# Patient Record
Sex: MALE | Race: OTHER | NOT HISPANIC OR LATINO | URBAN - METROPOLITAN AREA
[De-identification: names, ages, dates, MRNs, and addresses within clinical notes are randomized per-mention and may not be internally consistent; named-entity substitution may affect disease eponyms.]

---

## 2020-10-06 ENCOUNTER — EMERGENCY (EMERGENCY)
Facility: HOSPITAL | Age: 45
LOS: 1 days | Discharge: HOME | End: 2020-10-06
Attending: EMERGENCY MEDICINE | Admitting: EMERGENCY MEDICINE
Payer: COMMERCIAL

## 2020-10-06 VITALS
OXYGEN SATURATION: 98 % | WEIGHT: 164.91 LBS | HEART RATE: 74 BPM | DIASTOLIC BLOOD PRESSURE: 96 MMHG | TEMPERATURE: 98 F | SYSTOLIC BLOOD PRESSURE: 147 MMHG | RESPIRATION RATE: 18 BRPM

## 2020-10-06 VITALS
HEART RATE: 73 BPM | DIASTOLIC BLOOD PRESSURE: 82 MMHG | OXYGEN SATURATION: 98 % | RESPIRATION RATE: 18 BRPM | SYSTOLIC BLOOD PRESSURE: 132 MMHG | TEMPERATURE: 98 F
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
## 2020-10-06 DIAGNOSIS — M54.2 CERVICALGIA: ICD-10-CM
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
## 2020-10-06 DIAGNOSIS — S22.31XA FRACTURE OF ONE RIB, RIGHT SIDE, INITIAL ENCOUNTER FOR CLOSED FRACTURE: ICD-10-CM
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
## 2020-10-06 DIAGNOSIS — R07.82 INTERCOSTAL PAIN: ICD-10-CM
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
## 2020-10-06 PROCEDURE — 93976 VASCULAR STUDY: CPT

## 2020-10-06 PROCEDURE — 73030 X-RAY EXAM OF SHOULDER: CPT | Mod: 26,RT

## 2020-10-06 PROCEDURE — 71046 X-RAY EXAM CHEST 2 VIEWS: CPT | Mod: 26

## 2020-10-06 PROCEDURE — 99285 EMERGENCY DEPT VISIT HI MDM: CPT | Mod: 25

## 2020-10-06 PROCEDURE — 71100 X-RAY EXAM RIBS UNI 2 VIEWS: CPT | Mod: 26,RT

## 2020-10-06 PROCEDURE — 73030 X-RAY EXAM OF SHOULDER: CPT | Mod: 26

## 2020-10-06 PROCEDURE — 99284 EMERGENCY DEPT VISIT MOD MDM: CPT | Mod: 25

## 2020-10-06 PROCEDURE — 71100 X-RAY EXAM RIBS UNI 2 VIEWS: CPT | Mod: 26

## 2020-10-06 PROCEDURE — 71100 X-RAY EXAM RIBS UNI 2 VIEWS: CPT

## 2020-10-06 PROCEDURE — 73030 X-RAY EXAM OF SHOULDER: CPT

## 2020-10-06 PROCEDURE — 93976 VASCULAR STUDY: CPT | Mod: 26

## 2020-10-06 PROCEDURE — 96374 THER/PROPH/DIAG INJ IV PUSH: CPT

## 2020-10-06 PROCEDURE — 71046 X-RAY EXAM CHEST 2 VIEWS: CPT

## 2020-10-06 RX ORDER — METHOCARBAMOL 500 MG/1
1 TABLET, FILM COATED ORAL
Qty: 15 | Refills: 0
Start: 2020-10-06 | End: 2020-10-10

## 2020-10-06 RX ORDER — LIDOCAINE 4 G/100G
1 CREAM TOPICAL ONCE
Refills: 0 | Status: COMPLETED | OUTPATIENT
Start: 2020-10-06 | End: 2020-10-06

## 2020-10-06 RX ORDER — OXYCODONE HYDROCHLORIDE 5 MG/1
5 TABLET ORAL ONCE
Refills: 0 | Status: DISCONTINUED | OUTPATIENT
Start: 2020-10-06 | End: 2020-10-06

## 2020-10-06 RX ORDER — METHOCARBAMOL 500 MG/1
750 TABLET, FILM COATED ORAL ONCE
Refills: 0 | Status: COMPLETED | OUTPATIENT
Start: 2020-10-06 | End: 2020-10-06

## 2020-10-06 RX ORDER — KETOROLAC TROMETHAMINE 30 MG/ML
15 SYRINGE (ML) INJECTION ONCE
Refills: 0 | Status: DISCONTINUED | OUTPATIENT
Start: 2020-10-06 | End: 2020-10-06

## 2020-10-06 RX ORDER — OXYCODONE HYDROCHLORIDE 5 MG/1
1 TABLET ORAL
Qty: 12 | Refills: 0
Start: 2020-10-06 | End: 2020-10-09

## 2020-10-06 RX ORDER — IBUPROFEN 200 MG
1 TABLET ORAL
Qty: 30 | Refills: 0
Start: 2020-10-06 | End: 2020-10-15

## 2020-10-06 RX ADMIN — Medication 15 MILLIGRAM(S): at 12:16

## 2020-10-06 RX ADMIN — OXYCODONE HYDROCHLORIDE 5 MILLIGRAM(S): 5 TABLET ORAL at 13:21

## 2020-10-06 RX ADMIN — LIDOCAINE 1 PATCH: 4 CREAM TOPICAL at 13:21

## 2020-10-06 RX ADMIN — LIDOCAINE 1 PATCH: 4 CREAM TOPICAL at 12:16

## 2020-10-06 RX ADMIN — Medication 15 MILLIGRAM(S): at 13:00

## 2020-10-06 RX ADMIN — LIDOCAINE 1 PATCH: 4 CREAM TOPICAL at 13:22

## 2020-10-06 RX ADMIN — METHOCARBAMOL 750 MILLIGRAM(S): 500 TABLET, FILM COATED ORAL at 12:39

## 2020-10-06 NOTE — ED PROVIDER NOTE - OBJECTIVE STATEMENT
43 y/o M with no PMHx presents to the ED c/o severe pain to the right side, radiating up to the right shoulder, s/p slip and fall. Pt states he was in the shower earlier today when he slipped and fell onto a railing, landing on his right side. Denies hitting his head or LOC. Pt then went to an urgent care, where he was given tylenol and sent to the ED for evaluation. Currently states he is in excruciating pain and feels shocks of pain up to the shoulder, as well as some neck pain. Denies numbness, tingling, chest pain, palpitations, cough, SOB, or abdominal pain. Pt does currently have some hives as he is allergic to tylenol, but is most concerned about the pain.

## 2020-10-06 NOTE — ED ADULT TRIAGE NOTE - CHIEF COMPLAINT QUOTE
Patient BIBA c/o rt shoulder pain , s/p slipped and fall in the bathroom this am , denies any loc . not on thinner . Denies any head injury . Was sent from urgent care for evaluation .

## 2020-10-06 NOTE — ED PROVIDER NOTE - NO SIGNIFICANT PAST SURGICAL HISTORY
Anesthesia Volume In Cc (Will Not Render If 0): 0.5 <<----- Click to add NO significant Past Surgical History

## 2020-10-06 NOTE — ED PROVIDER NOTE - CARE PLAN
Principal Discharge DX:	Closed fracture of one rib of right side, initial encounter  Secondary Diagnosis:	Musculoskeletal pain

## 2020-10-06 NOTE — ED PROVIDER NOTE - PATIENT PORTAL LINK FT
You can access the FollowMyHealth Patient Portal offered by [facility] by registering at the following website: [email or web address]. By joining [facility]’s FollowMyHealth portal, you will also be able to view your health information using other applications (apps) compatible with our system.

## 2020-10-06 NOTE — ED PROVIDER NOTE - DIAGNOSTIC INTERPRETATION
ER PA: Marely Mack  CHEST XRAY INTERPRETATION: displaced 10th rib fracture noted, lungs clear, heart shadow normal. ER PA: Marely Mack  CHEST XRAY INTERPRETATION: displaced 10th rib fracture noted, lungs clear, heart shadow normal.    ER Physician Assistant: right shoulder  INTERPRETATION: no acute fracture; no soft tissue swelling noted; normal bony alignment.

## 2020-10-06 NOTE — ED ADULT NURSE NOTE - NSIMPLEMENTINTERV_GEN_ALL_ED
Implemented All Fall Risk Interventions:  [place] to call system. Call bell, personal items and telephone within reach. Instruct patient to call for assistance. Room bathroom lighting operational. Non-slip footwear when patient is off stretcher. Physically safe environment: no spills, clutter or unnecessary equipment. Stretcher in lowest position, wheels locked, appropriate side rails in place. Provide visual cue, wrist band, yellow gown, etc. Monitor gait and stability. Monitor for mental status changes and reorient to person, place, and time. Review medications for side effects contributing to fall risk. Reinforce activity limits and safety measures with patient and family.

## 2020-10-06 NOTE — ED PROVIDER NOTE - PHYSICAL EXAMINATION
General: Patient is well developed and well nourised. Patient is alert and oriented to person, place and date. Patient is sitting stretcher and appears in no acute distress.  HEENT: Head is normocephalic and atraumatic. Pupils are equal, round and reactive. Extraocular movements intact. No evidence of nystagmus, conjunctival injection, or scleral icterus. External ears symmetric without evidence of discharge.  Nose is symmetric, non-tender, patent without evidence of discharge. Teeth in good repair. Uvula midline.   Neck: Supple with no evidence of lymphadenopathy.  Full range of motion.  Heart: Regular rate and rhythm. No murmurs, rubs or gallops.   Lungs: Clear to auscultation bilaterally with equal chest expansion. No note of wheezes, rhonchi, rales. Equal chest expansion. No note of retractions.  Abdomen: Bowel sounds present in all four quadrants. Soft, non-tender, non-distended without signs of masses, rebound or guarding. No note of hepatosplenomegaly. No CVA tenderness bilaterally. Negative Chowdhuyr sign or McBurney's.  Musculoskeletal: point ttp right lateral/axillary inferior ribs. no ecchymosis, no crepitus or flail chest. no spinal ttp. ttp right cervical paraspinal muscles. No edema, erythema, ecchymosis, atrophy or deformity. F No clubbing or cyanosis. No point tenderness to palpation.   Neuro: Cranial nerves II-XII intact. GCS 15. Moving all extremities. Strength is 5/5 arms and legs bilaterally. Sensation intact in extremities. gait steady   Skin: Warm, dry and intact without evidence of rashes, bruising, pallor, jaundice or cyanosis.   Psych: Mood and affect appropriate.

## 2020-10-06 NOTE — ED PROVIDER NOTE - ATTENDING CONTRIBUTION TO CARE
43 yo M no pmh p/w slip and fall in shower earlier today no head injury back or neck pain, fell on bar to R ribs, no cough, sob, chest pain, ab pain, weakness.  Pt looks well, uncomfortable with breathing.  Point ttp R midaxillary ribs, R soft tissue trap pain. Plan robaxin, nsaids and lidocaine, xray and reassess and FAST exam.

## 2020-10-06 NOTE — ED PROVIDER NOTE - NSFOLLOWUPINSTRUCTIONS_ED_ALL_ED_FT
please take medication as prescribed-- please do not drink alcohol, use drugs or drive care/operate machinery while taking these medications    please use over the counter "lidocaine patches" which may be placed on areas of discomfort (your shoulder)    please use heat on areas of muscle (your neck/shoulder pain), ice on areas of bone (where you have your broken rib)    please use incentive spirometer       RIB FRACTURE - Discharge Care           Rib Fracture    WHAT YOU NEED TO KNOW:    A rib fracture is a crack or break in a rib bone. Rib fractures usually heal within 6 weeks. You should be able to return to your usual activities before that time.     Rib Cage         DISCHARGE INSTRUCTIONS:    Call your local emergency number (911 in the ) if:   •You have trouble breathing.       •You have new or increased pain.      Seek care immediately if:   •Your pain does not get better, even after treatment.      •You have a fever or a cough.       Call your doctor if:   •You have questions or concerns about your condition or care.           Medicines: You may need any of the following:   •NSAIDs, such as ibuprofen, help decrease swelling, pain, and fever. This medicine is available with or without a doctor's order. NSAIDs can cause stomach bleeding or kidney problems in certain people. If you take blood thinner medicine, always ask your healthcare provider if NSAIDs are safe for you. Always read the medicine label and follow directions.      •Prescription pain medicine may be given. Ask your healthcare provider how to take this medicine safely. Some prescription pain medicines contain acetaminophen. Do not take other medicines that contain acetaminophen without talking to your healthcare provider. Too much acetaminophen may cause liver damage. Prescription pain medicine may cause constipation. Ask your healthcare provider how to prevent or treat constipation.       •Take your medicine as directed. Contact your healthcare provider if you think your medicine is not helping or if you have side effects. Tell him or her if you are allergic to any medicine. Keep a list of the medicines, vitamins, and herbs you take. Include the amounts, and when and why you take them. Bring the list or the pill bottles to follow-up visits. Carry your medicine list with you in case of an emergency.      Self-care:   •Take deep breaths and cough 10 times each hour. This will decrease your risk for a lung infection. Hug a pillow on your injured side to decrease pain while you take deep breaths. Take a deep breath and hold it for as long as you can. Let the air out and then cough. Deep breaths help open your airway. You may be given an incentive spirometer to help you take deep breaths. Put the plastic piece in your mouth and take a slow, deep breath, then let the air out and cough. Repeat these steps 10 times every hour.       •Rest and limit activity as directed. Do not pull, push, or lift objects. Start to do more as your pain decreases. Ask your healthcare provider how much activity you can do.      •Apply ice on your chest near your fractured rib for 15 to 20 minutes every hour or as directed. Use an ice pack, or put crushed ice in a plastic bag. Cover it with a towel. Ice helps prevent tissue damage and decreases swelling and pain.      Follow up with your doctor as directed: Write down your questions so you remember to ask them during your visits.                              HOW TO USE AN INCENTIVE SPIROMETER - AfterCare(R) Instructions(ER/ED)           How to Use an Incentive Spirometer    WHAT YOU NEED TO KNOW:    An incentive spirometer is a device that measures how deeply you can inhale (breathe in). It helps you take slow, deep breaths to expand and fill your lungs with air. This helps prevent lung problems, such as pneumonia. The incentive spirometer is made up of a breathing tube, an air chamber, and an indicator. The breathing tube is connected to the air chamber and has a mouthpiece at the end. The indicator is found inside the device.     DISCHARGE INSTRUCTIONS:    Reasons to use an incentive spirometer: An incentive spirometer is most commonly used after surgery. People who are at increased risk of airway or breathing problems may also use one. These include people who smoke or have lung disease. This may also include people who are not active or cannot move well.     How to use an incentive spirometer: Sit up as straight as possible. Do not bend your head forward or backward. Hold the incentive spirometer in an upright position. Place the target pointer to the level that you need to reach or that your healthcare provider has suggested. Exhale (breathe out) normally and then do the following:  •Put the mouthpiece in your mouth and close your lips tightly around it. Do not block the mouthpiece with your tongue.       •Inhale slowly and deeply through the mouthpiece to raise the indicator. Try to make the indicator rise up to the level of the goal marker.      •When you cannot inhale any longer, remove the mouthpiece and hold your breath for at least 3 seconds.      •Exhale normally.      •Repeat these steps 10 to 12 times every hour when you are awake, or as often as directed.      •Clean the mouthpiece with soap and water after each use. Do not use a disposable mouthpiece for longer than 24 hours.      •Keep a log of the highest level you are able to reach each time. This will help healthcare providers see if your lung function improves.  How to use and Incentive Spirometer           Follow up with your healthcare provider as directed: Write down your questions so you remember to ask them during your visits.     Contact your healthcare provider if:   •You feel dizzy or lightheaded.      •You have a wound that is painful every time you breathe deeply.      •You have questions or concerns about how to use your IS.      Return to the emergency department if:   •You have chest pain or shortness of breath.      •You feel faint.         © Copyright [employer] 2020           back to top                          © Copyright [employer] 2020

## 2020-10-06 NOTE — ED PROVIDER NOTE - CLINICAL SUMMARY MEDICAL DECISION MAKING FREE TEXT BOX
Pleasant 45 y/o M presents to the ED s/p slip and fall while in the shower. Pt states he fell and struck the right sided ribs on a railing. Denies head injury or LOC. Also experiencing right sided neck pain. On exam pt appears well and nontoxic although uncomfortable, point tenderness to the right lateral ribs, no flail chest or bruising. Lungs are clear, heart regular rate and rhythm, abdomen soft, no spinal ttp. Plan for XRs and medications Pleasant 45 y/o M presents to the ED s/p slip and fall while in the shower. Pt states he fell and struck the right sided ribs on a railing. Denies head injury or LOC. Also experiencing right sided neck pain. On exam pt appears well and nontoxic although uncomfortable, point tenderness to the right lateral ribs, no flail chest or bruising. Lungs are clear, heart regular rate and rhythm, abdomen soft, no spinal ttp. Plan for XRs and meds. Migdalia 45 y/o M presents to the ED s/p slip and fall while in the shower. Pt states he fell and struck the right sided ribs on a railing. Denies head injury or LOC. Also experiencing right sided neck pain. On exam pt appears well and nontoxic although uncomfortable, point tenderness to the right lateral ribs, no flail chest or bruising. Lungs are clear, heart regular rate and rhythm, abdomen soft, no spinal ttp. Plan for XRs and meds. xr shows isolated right 10th rib fracture. FAST exam negative, no noted pneumothorax on bedside US. pt given incentive spirometer. rx sent muscle relaxant pain medication. recommend ice in areas of bone, heat on areas of muscle. ED evaluation and management discussed with the patient and family (if available) in detail.  Close PMD follow up encouraged.  Strict ED return instructions discussed in detail and patient given the opportunity to ask any questions about their discharge diagnosis and instructions. Patient verbalized understanding. Patient is agreeable to plan.

## 2020-10-13 ENCOUNTER — EMERGENCY (EMERGENCY)
Facility: HOSPITAL | Age: 45
LOS: 1 days | Discharge: ANOTHER HOSPITAL | End: 2020-10-13
Attending: EMERGENCY MEDICINE | Admitting: EMERGENCY MEDICINE
Payer: COMMERCIAL

## 2020-10-13 VITALS
OXYGEN SATURATION: 98 % | TEMPERATURE: 100 F | DIASTOLIC BLOOD PRESSURE: 86 MMHG | HEART RATE: 101 BPM | HEIGHT: 72 IN | SYSTOLIC BLOOD PRESSURE: 135 MMHG | RESPIRATION RATE: 18 BRPM | WEIGHT: 160.06 LBS

## 2020-10-13 VITALS
OXYGEN SATURATION: 100 % | TEMPERATURE: 100 F | HEART RATE: 99 BPM | DIASTOLIC BLOOD PRESSURE: 96 MMHG | RESPIRATION RATE: 18 BRPM | SYSTOLIC BLOOD PRESSURE: 142 MMHG

## 2020-10-13 DIAGNOSIS — R06.00 DYSPNEA, UNSPECIFIED: ICD-10-CM
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
## 2020-10-13 DIAGNOSIS — J94.2 HEMOTHORAX: ICD-10-CM

## 2020-10-13 DIAGNOSIS — S22.31XA FRACTURE OF ONE RIB, RIGHT SIDE, INITIAL ENCOUNTER FOR CLOSED FRACTURE: ICD-10-CM
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
## 2020-10-13 DIAGNOSIS — Z20.828 CONTACT WITH AND (SUSPECTED) EXPOSURE TO OTHER VIRAL COMMUNICABLE DISEASES: ICD-10-CM
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
## 2020-10-13 DIAGNOSIS — Z88.8 ALLERGY STATUS TO OTHER DRUGS, MEDICAMENTS AND BIOLOGICAL SUBSTANCES: ICD-10-CM

## 2020-10-13 PROBLEM — Z78.9 OTHER SPECIFIED HEALTH STATUS: Chronic | Status: ACTIVE | Noted: 2020-10-06

## 2020-10-13 LAB
ANION GAP SERPL CALC-SCNC: 14 MMOL/L — SIGNIFICANT CHANGE UP (ref 5–17)
BASOPHILS # BLD AUTO: 0 K/UL — SIGNIFICANT CHANGE UP (ref 0–0.2)
BASOPHILS NFR BLD AUTO: 0 % — SIGNIFICANT CHANGE UP (ref 0–2)
BUN SERPL-MCNC: 13 MG/DL — SIGNIFICANT CHANGE UP (ref 7–23)
CALCIUM SERPL-MCNC: 9.7 MG/DL — SIGNIFICANT CHANGE UP (ref 8.4–10.5)
CHLORIDE SERPL-SCNC: 98 MMOL/L — SIGNIFICANT CHANGE UP (ref 96–108)
CO2 SERPL-SCNC: 24 MMOL/L — SIGNIFICANT CHANGE UP (ref 22–31)
CREAT SERPL-MCNC: 0.88 MG/DL — SIGNIFICANT CHANGE UP (ref 0.5–1.3)
EOSINOPHIL # BLD AUTO: 0.6 K/UL — HIGH (ref 0–0.5)
EOSINOPHIL NFR BLD AUTO: 5.2 % — SIGNIFICANT CHANGE UP (ref 0–6)
GLUCOSE SERPL-MCNC: 118 MG/DL — HIGH (ref 70–99)
HCT VFR BLD CALC: 40.5 % — SIGNIFICANT CHANGE UP (ref 39–50)
HGB BLD-MCNC: 13.7 G/DL — SIGNIFICANT CHANGE UP (ref 13–17)
LACTATE SERPL-SCNC: 2.1 MMOL/L — HIGH (ref 0.5–2)
LYMPHOCYTES # BLD AUTO: 1.19 K/UL — SIGNIFICANT CHANGE UP (ref 1–3.3)
LYMPHOCYTES # BLD AUTO: 10.4 % — LOW (ref 13–44)
MCHC RBC-ENTMCNC: 29.8 PG — SIGNIFICANT CHANGE UP (ref 27–34)
MCHC RBC-ENTMCNC: 33.8 GM/DL — SIGNIFICANT CHANGE UP (ref 32–36)
MCV RBC AUTO: 88.2 FL — SIGNIFICANT CHANGE UP (ref 80–100)
MONOCYTES # BLD AUTO: 1.51 K/UL — HIGH (ref 0–0.9)
MONOCYTES NFR BLD AUTO: 13.1 % — SIGNIFICANT CHANGE UP (ref 2–14)
NEUTROPHILS # BLD AUTO: 8 K/UL — HIGH (ref 1.8–7.4)
NEUTROPHILS NFR BLD AUTO: 69.6 % — SIGNIFICANT CHANGE UP (ref 43–77)
PLATELET # BLD AUTO: 280 K/UL — SIGNIFICANT CHANGE UP (ref 150–400)
POTASSIUM SERPL-MCNC: 4.3 MMOL/L — SIGNIFICANT CHANGE UP (ref 3.5–5.3)
POTASSIUM SERPL-SCNC: 4.3 MMOL/L — SIGNIFICANT CHANGE UP (ref 3.5–5.3)
RBC # BLD: 4.59 M/UL — SIGNIFICANT CHANGE UP (ref 4.2–5.8)
RBC # FLD: 12.8 % — SIGNIFICANT CHANGE UP (ref 10.3–14.5)
SARS-COV-2 RNA SPEC QL NAA+PROBE: SIGNIFICANT CHANGE UP
SODIUM SERPL-SCNC: 136 MMOL/L — SIGNIFICANT CHANGE UP (ref 135–145)
WBC # BLD: 11.49 K/UL — HIGH (ref 3.8–10.5)
WBC # FLD AUTO: 11.49 K/UL — HIGH (ref 3.8–10.5)

## 2020-10-13 PROCEDURE — 71045 X-RAY EXAM CHEST 1 VIEW: CPT

## 2020-10-13 PROCEDURE — 85025 COMPLETE CBC W/AUTO DIFF WBC: CPT

## 2020-10-13 PROCEDURE — 99285 EMERGENCY DEPT VISIT HI MDM: CPT

## 2020-10-13 PROCEDURE — 93976 VASCULAR STUDY: CPT | Mod: 26

## 2020-10-13 PROCEDURE — 99285 EMERGENCY DEPT VISIT HI MDM: CPT | Mod: 25

## 2020-10-13 PROCEDURE — 71045 X-RAY EXAM CHEST 1 VIEW: CPT | Mod: 26

## 2020-10-13 PROCEDURE — 87040 BLOOD CULTURE FOR BACTERIA: CPT

## 2020-10-13 PROCEDURE — 80048 BASIC METABOLIC PNL TOTAL CA: CPT

## 2020-10-13 PROCEDURE — U0003: CPT

## 2020-10-13 PROCEDURE — 83605 ASSAY OF LACTIC ACID: CPT

## 2020-10-13 PROCEDURE — 96374 THER/PROPH/DIAG INJ IV PUSH: CPT

## 2020-10-13 PROCEDURE — 93976 VASCULAR STUDY: CPT

## 2020-10-13 RX ORDER — AMPICILLIN SODIUM AND SULBACTAM SODIUM 250; 125 MG/ML; MG/ML
3 INJECTION, POWDER, FOR SUSPENSION INTRAMUSCULAR; INTRAVENOUS ONCE
Refills: 0 | Status: COMPLETED | OUTPATIENT
Start: 2020-10-13 | End: 2020-10-13

## 2020-10-13 RX ORDER — SODIUM CHLORIDE 9 MG/ML
1000 INJECTION INTRAMUSCULAR; INTRAVENOUS; SUBCUTANEOUS ONCE
Refills: 0 | Status: COMPLETED | OUTPATIENT
Start: 2020-10-13 | End: 2020-10-13

## 2020-10-13 RX ADMIN — SODIUM CHLORIDE 1000 MILLILITER(S): 9 INJECTION INTRAMUSCULAR; INTRAVENOUS; SUBCUTANEOUS at 11:39

## 2020-10-13 RX ADMIN — AMPICILLIN SODIUM AND SULBACTAM SODIUM 200 GRAM(S): 250; 125 INJECTION, POWDER, FOR SUSPENSION INTRAMUSCULAR; INTRAVENOUS at 13:39

## 2020-10-13 NOTE — ED PROVIDER NOTE - CARE PLAN
Principal Discharge DX:	Hemothorax on right  Secondary Diagnosis:	Closed fracture of one rib of right side, initial encounter

## 2020-10-13 NOTE — ED ADULT NURSE NOTE - OBJECTIVE STATEMENT
Patient A&Ox4, resting in stretcher, respirations even and unlabored. Patient reports mechanical fall recently was seen in ED was told had right rib fracture was discharged. Patient arrives today with complaints of fever and chills and SOB on exertion, Tmax 103, reports temp today was Tmax 101, reports taking tylenol. Patient denies chest pain. Left Ac 20g IV placed, Right Ac 18g IV placed, labs drawn and sent. Placed on 2L %, Patient evaluated by Dr. Mandujano. Will continue to monitor patient. Patient A&Ox4, resting in stretcher, respirations even and unlabored. Patient reports mechanical fall recently was seen in ED was told had right rib fracture was discharged. Patient arrives today with complaints of fever and chills and SOB on exertion, Tmax 103, reports temp today was Tmax 101, reports taking tylenol without side effects or allergic reaction. Patient denies chest pain. Left Ac 20g IV placed, Right Ac 18g IV placed, labs drawn and sent. Placed on 2L %, Patient evaluated by Dr. Mandujano. Will continue to monitor patient.

## 2020-10-13 NOTE — ED ADULT TRIAGE NOTE - CHIEF COMPLAINT QUOTE
Patient complaining of COLÓN, fevers and nausea for four days.  Patient states Tmax of 101F today, did not take any antipyretics.  Patient denies any SOB at rest, CP, vomiting, diarrhea or any other complaints at this time.

## 2020-10-13 NOTE — ED PROVIDER NOTE - CLINICAL SUMMARY MEDICAL DECISION MAKING FREE TEXT BOX
SS Noted above concerning for complicated rib fx with empyema vs pna vs hemothorax.  Plan labs, cultures, iv abx and tx to trauma center.

## 2020-10-13 NOTE — ED ADULT NURSE REASSESSMENT NOTE - NS ED NURSE REASSESS COMMENT FT1
Patient to be transferred to [facility], reports given to charge nurse Stephanie, receiving MD uDnne. Pending transportation. Patient in no acute distress, will continue to monitor patient.

## 2020-10-13 NOTE — ED PROVIDER NOTE - OBJECTIVE STATEMENT
44 M s/p recent fall in shower 1 week ago with 10th rib fx now with increased COLÓN, fatigue and fever, Tm 103 for past 3 days.  Denies cough, ab pain, n/v, le edema or calf pains.

## 2020-10-18 LAB
CULTURE RESULTS: SIGNIFICANT CHANGE UP
CULTURE RESULTS: SIGNIFICANT CHANGE UP
SPECIMEN SOURCE: SIGNIFICANT CHANGE UP
SPECIMEN SOURCE: SIGNIFICANT CHANGE UP
